# Patient Record
Sex: FEMALE | Race: WHITE | NOT HISPANIC OR LATINO | Employment: FULL TIME | URBAN - METROPOLITAN AREA
[De-identification: names, ages, dates, MRNs, and addresses within clinical notes are randomized per-mention and may not be internally consistent; named-entity substitution may affect disease eponyms.]

---
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
## 2022-02-12 VITALS
TEMPERATURE: 99.1 F | HEIGHT: 64 IN | WEIGHT: 167.6 LBS | SYSTOLIC BLOOD PRESSURE: 128 MMHG | WEIGHT: 167.6 LBS | HEART RATE: 76 BPM | TEMPERATURE: 99.3 F | DIASTOLIC BLOOD PRESSURE: 72 MMHG | HEART RATE: 88 BPM | DIASTOLIC BLOOD PRESSURE: 84 MMHG | BODY MASS INDEX: 28.61 KG/M2 | HEIGHT: 64 IN | SYSTOLIC BLOOD PRESSURE: 120 MMHG | BODY MASS INDEX: 28.61 KG/M2

## 2022-02-15 NOTE — LETTER
(Inserted Image. Unable to display)   March 11, 2019      [facility]      [address]  [address] [place], [place] [id number]        Dear TRACEY,    Thank you for selecting [facility] for your healthcare needs.  Below you will find the results of the recent tests done at our clinic.      These tests are negative, Tracey.      Result Name Current Result Reference Range   Chlamydia RNA  NOT DETECTED 3/6/2019 NOT DETECTED -    Neisseria gonorrhoeae RNA  NOT DETECTED 3/6/2019 NOT DETECTED -        Please contact me or my assistant at [phone] if you have any questions or concerns.     Sincerely,        WENDY Bo-NP  Family Nurse Practitioner      What do your labs mean?  Below is a glossary of commonly ordered labs:  LDL   Bad Cholesterol   HDL   Good Cholesterol  AST/ALT   Liver Function   Cr/Creatinine   Kidney Function  Microalbumin   Kidney Function  BUN   Kidney Function  PSA   Prostate    TSH   Thyroid Hormone  HgbA1c   Diabetes Test   Hgb (Hemoglobin)   Red Blood Cells  WBC   White Blood Cell Count

## 2022-02-15 NOTE — NURSING NOTE
Comprehensive Intake Entered On:  3/13/2019 3:02 PM CDT    Performed On:  3/13/2019 2:58 PM CDT by Vanessa Rasheed               Summary   Chief Complaint :   Pt here for Nexplanon insertion   Menstrual Status :   Menarcheal   Weight Measured :   167.6 lb(Converted to: 167 lb 10 oz, 76.02 kg)    Height Measured :   63.75 in(Converted to: 5 ft 4 in, 161.92 cm)    Body Mass Index :   28.99 kg/m2   Body Surface Area :   1.85 m2   Systolic Blood Pressure :   128 mmHg   Diastolic Blood Pressure :   84 mmHg (HI)    Mean Arterial Pressure :   99 mmHg   Peripheral Pulse Rate :   88 bpm   BP Site :   Right arm   Pulse Site :   Radial artery   BP Method :   Manual   HR Method :   Manual   Temperature Tympanic :   99.3 DegF(Converted to: 37.4 DegC)    Vanessa Rasheed - 3/13/2019 2:58 PM CDT   Health Status   Allergies Verified? :   Yes   Medication History Verified? :   Yes   Medical History Verified? :   Yes   Pre-Visit Planning Status :   Completed   Tobacco Use? :   Never smoker   Vanessa Rasheed - 3/13/2019 2:58 PM CDT   Consents   Consent for Immunization Exchange :   Consent Granted   Consent for Immunizations to Providers :   Consent Granted   Vanessa Rasheed - 3/13/2019 2:58 PM CDT   Meds / Allergies   (As Of: 3/13/2019 3:02:38 PM CDT)   Allergies (Active)   No Known Medication Allergies  Estimated Onset Date:   Unspecified ; Created By:   Vanessa Rasheed; Reaction Status:   Active ; Category:   Drug ; Substance:   No Known Medication Allergies ; Type:   Allergy ; Updated By:   Vanessa Rasheed; Reviewed Date:   3/13/2019 3:02 PM CDT        Medication List   (As Of: 3/13/2019 3:02:38 PM CDT)   No Known Home Medications     Vanessa Rasheed - 3/13/2019 3:02:35 PM      Prescription/Discharge Order    fluconazole  :   fluconazole ; Status:   Completed ; Ordered As Mnemonic:   fluconazole 150 mg oral tablet ; Simple Display Line:   150 mg, 1 tab(s), PO, Once, 1 tab(s), 0 Refill(s) ; Ordering Provider:   Yang  Abena MERRILL; Catalog Code:   fluconazole ; Order Dt/Tm:   3/6/2019 3:19:52 PM

## 2022-02-15 NOTE — NURSING NOTE
Depression Screening Entered On:  3/7/2019 6:34 PM CST    Performed On:  3/6/2019 6:34 PM CST by Peyton Durand               Depression Screening   Feeling Down, Depressed, Hopeless :   Not at all   Little Interest - Pleasure in Activities :   Not at all   Initial Depression Screen Score :   0    Trouble Falling or Staying Asleep :   Several days   Feeling Tired or Little Energy :   Not at all   Poor Appetite or Overeating :   Not at all   Feeling Bad About Yourself :   Not at all   Trouble Concentrating :   Not at all   Moving or Speaking Slowly :   Not at all   Thoughts Better Off Dead or Hurting Self :   Not at all   Detailed Depression Screen Score :   1    Total Depression Screen Score :   1    ROBERT Difficulty with Work, Home, Others :   Not difficult at all   Peyton Durand - 3/7/2019 6:34 PM CST

## 2022-02-15 NOTE — PROGRESS NOTES
Patient:   TRACEY GARCIA            MRN: [id number]            FIN: [id number]               Age:   18 years     Sex:  Female     :  2000   Associated Diagnoses:   Nexplanon insertion   Author:   Abena Gibson      Visit Information      Date of Service: 2019 02:35 pm  Performing Location: [facility]  Encounter#: [id number]      Chief Complaint   3/13/2019 2:58 PM CDT    Pt here for Nexplanon insertion        Interval History   Concerning symptoms as listed in Chief Complaint above discussed and confirmed with patient         Health Status   Allergies:    Allergic Reactions (Selected)  No Known Medication Allergies   Medications:  (Selected)   Prescriptions  Prescribed  Nexplanon 68 mg subcutaneous implant: = 1 EA ( 68 mg ), Subcutaneous, once, Instructions: placed 3/13/19, due for removal by 3/13/22., # 1 EA, 0 Refill(s), Type: Soft Stop, other reason (Rx)   Problem list:    No problem items selected or recorded.      Histories   Past Medical History:    No active or resolved past medical history items have been selected or recorded.   Family History:    Diabetes mellitus  Mother  Heart disease  Mother  High blood pressure  Mother     Procedure history:    Insertion of etonogestrel implant (SNOMED CT [id number]) performed by Abena Gibson on 3/13/2019 at 18 Years.  Comments:  3/13/2019 3:36 PM CDT - Vanessa Rasheed  consent form signed with NCB. Nexplanon placed in LUE.    due for removal by 3/19/2022    Lot: [id number]  Exp: 8/15/21   Social History:        Alcohol Assessment            Never      Tobacco Assessment: Denies Tobacco Use      Substance Abuse Assessment: Denies Substance Abuse      Home and Environment Assessment            Marital status: Single.      Exercise and Physical Activity Assessment: Does not exercise      Physical Examination   Patient returns after having received Nexplanon insertion counseling.  Desires to receive implant.  Reviewed  use/risks/benefits and bleeding profile. Reviewed risks of insertion and removal, need for possible back up method and need to rule out pregnancy.  Urine Pregnancy test was done and was negative.  LMP:                 Current method of contraception:  NO CONTRAINDICATIONS to procedure.     Vital Signs   3/13/2019 2:58 PM CDT Temperature Tympanic 99.3 DegF    Peripheral Pulse Rate 88 bpm    Pulse Site Radial artery    HR Method Manual    Systolic Blood Pressure 128 mmHg    Diastolic Blood Pressure 84 mmHg  HI    Mean Arterial Pressure 99 mmHg    BP Site Right arm    BP Method Manual      Measurements from flowsheet : Measurements   3/13/2019 2:58 PM CDT Height Measured - Standard 63.75 in    Weight Measured - Standard 167.6 lb    BSA 1.85 m2    Body Mass Index 28.99 kg/m2    Body Mass Index Percentile 92.90      General:  Alert and oriented.       Procedure   PROCEDURE NOTE: Patient Consent Form was reviewed with and provided to the patient.   After receiving informed consent the patient was moved to the procedure room where she was moved onto the exam table and the nondominant arm, __left_______ arm, was lifted above her head and the area between the triceps and biceps on the inner aspect of the arm was located and marked in preparation for Nexplanon insertion approximately 8 cm from the medial epicondyle with a second suzy a few centimeters proximal to this to serve as a direction guide during insertion.. The area was cleaned with Betadine swabs.  A sterile field was draped over the site. The site was then anesthetized with 1% lidocaine, approximately 2.0 cc. The patient tolerated this well.  The Nexplanon applicator was removed from the sterile packaging and the Nexplanon vincent was easily visible.   Countertraction was applied around the skin and at a 20 degree  angle the Nexplanon was inserted, beveled side up.  The applicator was then lowered to a horizontal position and by tenting the skin I was able to fully  insert the needle.  The purple slider was moved back until it stopped. The applicator was removed.  The Nexplanon vincent was easily inserted into the skin subdermally.  The patient also was able to palpate the vincent in place.  The area was then covered with ointment and Band-Aid and wrapped with a conform dressing.  She tolerated this well. I instructed her on watching for signs of infection.She may apply an ice pack periodically and use ibuprofen or acetaminophen for discomfort if she has no allergies. She may remove the dressing after 24 hours. She was given the user card .   She tolerated the procedure well  She is aware of the potential for altered bleeding pattern and is also instructed on the importance of  Pap smear screenings,  pelvic exams,  and STI testing.        Removal Date: 3 years   Switching from another hormonal method? no, UPT neg today but last IC 5 days ago protected, no UPI since LMP of 3/1/2019  Nexplanon successfully Palpated in patients arm by Clinician?                   By patient?      Review / Management   Results review:  Lab results   3/13/2019 2:52 PM CDT U HCG POC NEGATIVE   3/6/2019 3:46 PM CST Chlam/GC Comments See comment    Chlamydia RNA NOT DETECTED    Neisseria gonorrhoeae RNA NOT DETECTED   , upt negative.       Impression and Plan   Implanon insertion  Plan:  as above   Diagnosis     Nexplanon insertion (THP95-RT Z30.017).     Patient Instructions:       Counseled: Patient, Regarding diagnosis, Regarding treatment, Regarding medications, Verbalized understanding, back up contraception x3 weeks.

## 2022-02-15 NOTE — NURSING NOTE
CAGE Assessment Entered On:  3/7/2019 6:34 PM CST    Performed On:  3/6/2019 6:34 PM CST by Peyton Durand               Assessment   Have you ever felt you should cut down on your drinking :   No   Have people annoyed you by criticizing your drinking :   No   Have you ever felt bad or guilty about your drinking :   No   Have you ever taken a drink first thing in the morning to steady your nerves or get rid of a hangover (Eye-opener) :   No   CAGE Score :   0    Peyton Durand - 3/7/2019 6:34 PM CST

## 2022-02-15 NOTE — PROGRESS NOTES
Patient:   TRACEY GARCIA            MRN: [id number]            FIN: [id number]               Age:   18 years     Sex:  Female     :  2000   Associated Diagnoses:   Well adult; Family planning advice; Screen for STD (sexually transmitted disease)   Author:   Abena Gibson      Chief Complaint   3/6/2019 2:59 PM CST     New pt here for annual physical and would also like to start ocp or Nexplanon- has not used any contraception in the past.        Well Adult History   Well Adult History             The patient presents for well adult exam, new to clinic  [employer] student, freshman  Recently initiated IC, one partner, has some vaginal itch, feels like yeast  agreeable to chlamydia screen  would like information on Nexplanon  she and partner using condoms 100%    PHQ 9 score low, is enjoying school  non smoker.  The general health status is good.  The patient's diet is described as balanced.  Exercise: none.  Associated symptoms consist of weight loss and over the holidays, more active than she has been, lives on 3rd floor.  Last menstrual period: regular.  Medical encounters:.  Additional pertinent history: tobacco use none.        Review of Systems   Constitutional:  Negative except as documented in history of present illness.    Eye:  Negative except as documented in history of present illness.    Respiratory:  Negative except as documented in history of present illness.    Cardiovascular:  Negative except as documented in history of present illness.    Breast:  Negative.    Gastrointestinal:  Negative except as documented in history of present illness.    Genitourinary:  Negative except as documented in history of present illness.    Gynecologic:  Negative except as documented in history of present illness.    Hematology/Lymphatics:  Negative except as documented in history of present illness.    Endocrine:  Negative except as documented in history of present illness.    Immunologic:  Negative except as  documented in history of present illness.    Musculoskeletal:  Negative except as documented in history of present illness.    Integumentary:  Negative except as documented in history of present illness.    Neurologic:  Negative except as documented in history of present illness.    Psychiatric:  Negative except as documented in history of present illness.              Health Status   Allergies:    Allergic Reactions (Selected)  No Known Medication Allergies   Medications:  (Selected)   Prescriptions  Prescribed  fluconazole 150 mg oral tablet: = 1 tab(s) ( 150 mg ), PO, Once, # 1 tab(s), 0 Refill(s), Type: Soft Stop, Pharmacy: [facility], 1 tab(s) Oral once   Problem list:    No problem items selected or recorded.      Histories   Past Medical History:    No active or resolved past medical history items have been selected or recorded.   Family History:    No family history items have been selected or recorded.   Procedure history:    No active procedure history items have been selected or recorded.   Social History:             No active social history items have been recorded.      Physical Examination   Vital Signs   3/6/2019 2:59 PM CST Temperature Tympanic 99.1 DegF    Peripheral Pulse Rate 76 bpm    Pulse Site Radial artery    HR Method Manual    Systolic Blood Pressure 120 mmHg    Diastolic Blood Pressure 72 mmHg    Mean Arterial Pressure 88 mmHg    BP Site Right arm    BP Method Manual      Measurements from flowsheet : Measurements   3/6/2019 2:59 PM CST Height Measured - Standard 63.75 in    Weight Measured - Standard 167.6 lb    BSA 1.85 m2    Body Mass Index 28.99 kg/m2    Body Mass Index Percentile 92.90      General:  Alert and oriented, No acute distress, vital signs stable, as noted above.    Eye:  Pupils are equal, round and reactive to light, Extraocular movements are intact, Normal conjunctiva.    HENT:  Normocephalic, Tympanic membranes are clear, Normal hearing, Oral  mucosa is moist, No pharyngeal erythema.    Neck:  Supple, Non-tender, No lymphadenopathy, No thyromegaly.    Respiratory:  Lungs are clear to auscultation, Respirations are non-labored, Breath sounds are equal, Symmetrical chest wall expansion.    Cardiovascular:  Normal rate, Regular rhythm, No murmur, No edema.    Gastrointestinal:  Soft, Non-tender, Non-distended, No organomegaly.    Musculoskeletal:  Normal range of motion, Normal strength, No deformity, Normal gait.    Integumentary:  Warm, Dry, Pink, Intact, No rash.    Neurologic:  Alert, Oriented, Normal sensory, Normal motor function, Cranial Nerves II-XII are grossly intact.    Psychiatric:  Cooperative, Appropriate mood & affect, Normal judgment, PHQ 9/CAGE questionaire reviewed and discussed with patient,  see score.       Impression and Plan   Diagnosis     Well adult (ZWJ00-QE Z00.00).     Family planning advice ([id number] Z30.09).     Screen for STD (sexually transmitted disease) (IYA91-DP Z11.3).     Patient Instructions:       Counseled: Patient, Regarding diagnosis, Regarding medications, Verbalized understanding, counseled on health benefits of healthy weight, regular exercise, healthy diet, counseled on the use/risk/benefit of Nexplanon including migration risks and timing for insertion  she will check insurance and schedule.    Orders     Orders (Selected)   Outpatient Orders  Ordered (In Transit)  Chlamydia/Neisseria gonorrhoeae RNA, TMA* ([facility]): Specimen Type: Urine, Collection Date: 03/06/19 15:36:00 CST  Prescriptions  Prescribed  fluconazole 150 mg oral tablet: = 1 tab(s) ( 150 mg ), PO, Once, # 1 tab(s), 0 Refill(s), Type: Soft Stop, Pharmacy: [facility], 1 tab(s) Oral once.

## 2022-02-15 NOTE — NURSING NOTE
Comprehensive Intake Entered On:  3/6/2019 3:05 PM CST    Performed On:  3/6/2019 2:59 PM CST by Vanessa Rasheed               Summary   Chief Complaint :   New pt here for annual physical and would also like to start ocp or Nexplanon- has not used any contraception in the past.    Last Menstrual Period :   3/1/2019 CST   Menstrual Status :   Menarcheal   Weight Measured :   167.6 lb(Converted to: 167 lb 10 oz, 76.02 kg)    Height Measured :   63.75 in(Converted to: 5 ft 4 in, 161.92 cm)    Body Mass Index :   28.99 kg/m2   Body Surface Area :   1.85 m2   Systolic Blood Pressure :   120 mmHg   Diastolic Blood Pressure :   72 mmHg   Mean Arterial Pressure :   88 mmHg   Peripheral Pulse Rate :   76 bpm   BP Site :   Right arm   Pulse Site :   Radial artery   BP Method :   Manual   HR Method :   Manual   Temperature Tympanic :   99.1 DegF(Converted to: 37.3 DegC)    Vanessa Rasheed - 3/6/2019 2:59 PM CST   Health Status   Allergies Verified? :   Yes   Medication History Verified? :   Yes   Medical History Verified? :   Yes   Pre-Visit Planning Status :   Completed   Tobacco Use? :   Never smoker   Vanessa Rasheed - 3/6/2019 2:59 PM CST   Consents   Consent for Immunization Exchange :   Consent Granted   Consent for Immunizations to Providers :   Consent Granted   Vanessa Rasheed - 3/6/2019 2:59 PM CST   Meds / Allergies   (As Of: 3/6/2019 3:05:23 PM CST)   Allergies (Active)   No Known Medication Allergies  Estimated Onset Date:   Unspecified ; Created By:   Vanessa Rasheed; Reaction Status:   Active ; Category:   Drug ; Substance:   No Known Medication Allergies ; Type:   Allergy ; Updated By:   Vanessa Rasheed; Reviewed Date:   3/6/2019 3:05 PM CST        Medication List   (As Of: 3/6/2019 3:05:23 PM CST)   No Known Home Medications     Vanessa Rasheed - 3/6/2019 3:05:20 PM

## 2022-06-20 ENCOUNTER — TELEPHONE (OUTPATIENT)
Dept: FAMILY MEDICINE | Facility: CLINIC | Age: 22
End: 2022-06-20

## 2022-06-20 NOTE — TELEPHONE ENCOUNTER
[id number] Spoke with patient. Pt had Nexplanon placed 03/01/2019 and was due for removal in 3 years. I was unable to find any documentation of the removal. Pt tells me that she does not have health insurance and is not able to afford having the Nexplanon removed at this time. I did remind her that she can no longer rely on this as an effective form of birth control. Pt tells me that she understands that and is using an alternative method.

## 2024-10-16 ENCOUNTER — OFFICE VISIT (OUTPATIENT)
Dept: FAMILY MEDICINE | Facility: CLINIC | Age: 24
End: 2024-10-16
Payer: COMMERCIAL

## 2024-10-16 VITALS
RESPIRATION RATE: 16 BRPM | SYSTOLIC BLOOD PRESSURE: 116 MMHG | HEART RATE: 102 BPM | HEIGHT: 64 IN | TEMPERATURE: 98.5 F | OXYGEN SATURATION: 99 % | BODY MASS INDEX: 32.27 KG/M2 | WEIGHT: 189 LBS | DIASTOLIC BLOOD PRESSURE: 72 MMHG

## 2024-10-16 DIAGNOSIS — Z30.8 ENCOUNTER FOR OTHER CONTRACEPTIVE MANAGEMENT: Primary | ICD-10-CM

## 2024-10-16 LAB — HCG UR QL: NEGATIVE

## 2024-10-16 PROCEDURE — 81025 URINE PREGNANCY TEST: CPT

## 2024-10-16 PROCEDURE — 11983 REMOVE/INSERT DRUG IMPLANT: CPT

## 2024-10-16 PROCEDURE — 99202 OFFICE O/P NEW SF 15 MIN: CPT | Mod: 25

## 2024-10-16 NOTE — PROGRESS NOTES
"  Assessment & Plan     Encounter for other contraceptive management  LMP 10/3/24, no sexual intercourse since that time.  Negative urine hCG test today.  Consent signed.    Time out was performed prior to initiating procedure to be sure of right patient right location. The area surrounding the Nexplanon was prepared with Choloraprep and draped in the usual sterile manner. The site was anesthetized with lidocaine. A skin incision was made over the distal aspect of the device. The capsule lysed sharply and the device removed using a hemostat. Hemostasis was assured. A new Nexplanon was then inserted though the wound. The site was dressed with Steristrips and a pressure dressing. Patient tolerated procedure well.     - HCG qualitative urine; Future  - HCG qualitative urine  - etonogestrel (NEXPLANON) subdermal implant 68 mg        BMI  Estimated body mass index is 32.44 kg/m  as calculated from the following:    Height as of this encounter: 1.626 m (5' 4\").    Weight as of this encounter: 85.7 kg (189 lb).             Subjective   Carolyne is a 24 year old, presenting for the following health issues:  Contraception (Nexplanon removal and reinsertion. Left arm)      10/16/2024     9:43 AM   Additional Questions   Roomed by srud   Accompanied by n/a     Contraception    History of Present Illness       Reason for visit:  Birth Control implant removal and replacement   She is taking medications regularly.                     Objective    /72 (BP Location: Right arm, Patient Position: Sitting)   Pulse 102   Temp 98.5  F (36.9  C) (Tympanic)   Resp 16   Ht 1.626 m (5' 4\")   Wt 85.7 kg (189 lb)   LMP 10/03/2024 (Exact Date)   SpO2 99%   BMI 32.44 kg/m    Body mass index is 32.44 kg/m .  Physical Exam     Physical Exam  Constitutional:       Appearance: Normal appearance.   HENT:      Head: Normocephalic.      Mouth/Throat:      Mouth: Mucous membranes are moist.   Eyes:      Extraocular Movements: Extraocular " movements intact.      Conjunctiva/sclera: Conjunctivae normal.   Cardiovascular:      Rate and Rhythm: Normal rate.   Pulmonary:      Effort: Pulmonary effort is normal. No respiratory distress.   Skin:     General: Skin is warm and dry.      Capillary Refill: Capillary refill takes less than 2 seconds.   Neurological:      Mental Status: She is alert and oriented to person, place, and time.   Psychiatric:         Behavior: Behavior normal.         Thought Content: Thought content normal.               Signed Electronically by: JENNIFER qAuino CNP

## 2024-12-14 ENCOUNTER — HEALTH MAINTENANCE LETTER (OUTPATIENT)
Age: 24
End: 2024-12-14